# Patient Record
Sex: FEMALE | Race: WHITE
[De-identification: names, ages, dates, MRNs, and addresses within clinical notes are randomized per-mention and may not be internally consistent; named-entity substitution may affect disease eponyms.]

---

## 2017-03-31 ENCOUNTER — HOSPITAL ENCOUNTER (OUTPATIENT)
Dept: HOSPITAL 61 - PCVCCLINIC | Age: 67
Discharge: HOME | End: 2017-03-31
Attending: RADIOLOGY
Payer: COMMERCIAL

## 2017-03-31 DIAGNOSIS — I71.4: Primary | ICD-10-CM

## 2017-03-31 DIAGNOSIS — I73.9: ICD-10-CM

## 2017-03-31 DIAGNOSIS — I77.9: ICD-10-CM

## 2017-03-31 DIAGNOSIS — I25.10: ICD-10-CM

## 2017-03-31 DIAGNOSIS — E78.00: ICD-10-CM

## 2017-03-31 DIAGNOSIS — I10: ICD-10-CM

## 2017-03-31 DIAGNOSIS — N26.1: ICD-10-CM

## 2017-03-31 PROCEDURE — G0463 HOSPITAL OUTPT CLINIC VISIT: HCPCS

## 2017-11-30 ENCOUNTER — HOSPITAL ENCOUNTER (OUTPATIENT)
Dept: HOSPITAL 61 - PCVCIMAG | Age: 67
Discharge: HOME | End: 2017-11-30
Attending: RADIOLOGY
Payer: COMMERCIAL

## 2017-11-30 DIAGNOSIS — E78.00: ICD-10-CM

## 2017-11-30 DIAGNOSIS — I70.1: ICD-10-CM

## 2017-11-30 DIAGNOSIS — J44.9: ICD-10-CM

## 2017-11-30 DIAGNOSIS — I77.9: ICD-10-CM

## 2017-11-30 DIAGNOSIS — I10: ICD-10-CM

## 2017-11-30 DIAGNOSIS — F17.200: ICD-10-CM

## 2017-11-30 DIAGNOSIS — I65.23: Primary | ICD-10-CM

## 2017-11-30 DIAGNOSIS — I71.4: ICD-10-CM

## 2017-11-30 PROCEDURE — G0463 HOSPITAL OUTPT CLINIC VISIT: HCPCS

## 2017-11-30 PROCEDURE — 93978 VASCULAR STUDY: CPT

## 2017-11-30 PROCEDURE — 93880 EXTRACRANIAL BILAT STUDY: CPT

## 2017-11-30 NOTE — PCVCIMAG
EXAM: AORTOILIAC DUPLEX



INDICATION: Abdominal aortic aneurysm with prior stent graft repair. 



FINDINGS: 



AORTA: Suprarenal aorta measures maximum diameter of 2.5 cm. Prior

stent graft repair of abdominal aortic aneurysm appears intact without

obvious endoleak. Residual aneurysm sac measures maximum diameter of

6.5 cm. No significant aortic stenosis.



RIGHT COMMON ILIAC ARTERY: Maximum diameter is 2.1 cm. No significant

stenosis.



RIGHT EXTERNAL ILIAC ARTERY:  No significant stenosis.



LEFT COMMON ILIAC ARTERY: Maximum diameter is 2.3 cm.  No significant

stenosis.



LEFT EXTERNAL ILIAC ARTERY:  No significant stenosis.



IMPRESSION: 

Intact stent graft repair of abdominal aortic aneurysm by ultrasound

criteria. Residual aneurysm sac has not definitely changed in size

since prior CT study.



LOC:[id number]

## 2017-11-30 NOTE — PCVCIMAG
EXAM: BILATERAL CAROTID DUPLEX



INDICATION: Carotid Occlusive Disease.



FINDINGS: 

Doppler Measurements (centimeters per second):



RIGHT:  Peak CCA-82, Peak ECA-130, Diastolic ICA-35, Peak ICA-131,

ICA/CCA Ratio-1.6.



LEFT:  Peak CCA-72, Peak ECA-360, Diastolic ICA-60, Peak ICA-239,

ICA/CCA Ratio-3.3.



RIGHT CAROTID: The carotid bulb has moderate plaque. The proximal

internal carotid artery shows 40-50% stenosis. The common carotid

artery shows no significant stenosis. The external carotid artery

shows no significant stenosis.



LEFT CAROTID:  The carotid bulb has moderate plaque. The proximal

internal carotid artery shows 70% stenosis. The common carotid artery

shows no significant stenosis. The external carotid artery shows 90%

stenosis.



Antegrade flow in both vertebral arteries.



IMPRESSION: 

40-50% stenosis of the right internal carotid artery with moderate

plaque.

70% stenosis of the left internal carotid artery with moderate plaque.



LOC:[place] [id number]

## 2018-05-07 ENCOUNTER — HOSPITAL ENCOUNTER (OUTPATIENT)
Dept: HOSPITAL 61 - PCVCIMAG | Age: 68
Discharge: HOME | End: 2018-05-07
Attending: INTERNAL MEDICINE
Payer: COMMERCIAL

## 2018-05-07 DIAGNOSIS — I71.4: ICD-10-CM

## 2018-05-07 DIAGNOSIS — I10: ICD-10-CM

## 2018-05-07 DIAGNOSIS — I70.1: ICD-10-CM

## 2018-05-07 DIAGNOSIS — I73.9: Primary | ICD-10-CM

## 2018-05-07 PROCEDURE — 93978 VASCULAR STUDY: CPT

## 2018-05-07 PROCEDURE — 76770 US EXAM ABDO BACK WALL COMP: CPT

## 2018-05-07 PROCEDURE — 93975 VASCULAR STUDY: CPT

## 2018-05-07 PROCEDURE — 93925 LOWER EXTREMITY STUDY: CPT

## 2018-08-28 ENCOUNTER — HOSPITAL ENCOUNTER (OUTPATIENT)
Dept: HOSPITAL 61 - PCVCIMAG | Age: 68
Discharge: HOME | End: 2018-08-28
Attending: RADIOLOGY
Payer: COMMERCIAL

## 2018-08-28 DIAGNOSIS — I65.23: Primary | ICD-10-CM

## 2018-08-28 DIAGNOSIS — E78.5: ICD-10-CM

## 2018-08-28 DIAGNOSIS — I10: ICD-10-CM

## 2018-08-28 PROCEDURE — 93880 EXTRACRANIAL BILAT STUDY: CPT

## 2018-08-28 NOTE — PCVCIMAG
EXAM: BILATERAL CAROTID DUPLEX



INDICATION: Carotid Occlusive Disease.



FINDINGS: 

Doppler Measurements (centimeters per second):



RIGHT:  Peak CCA-110, Peak ECA-105, Diastolic ICA-27, Peak ICA-106,

ICA/CCA Ratio-1.0.



LEFT:  Peak CCA-89, Peak ECA-299, Diastolic ICA-39, Peak ICA-207,

ICA/CCA Ratio-2.3.



RIGHT CAROTID: The carotid bulb has moderate plaque. The proximal

internal carotid artery shows <40% stenosis. The common carotid artery

shows no significant stenosis. The external carotid artery shows no

significant stenosis.



LEFT CAROTID:  The carotid bulb has moderate plaque. The proximal

internal carotid artery shows 60-70% stenosis. The common carotid

artery shows no significant stenosis. The external carotid artery

shows 80% stenosis.



Antegrade flow in both vertebral arteries.



IMPRESSION: 

<40% stenosis of the right internal carotid artery with moderate

plaque.

60-70% stenosis of the left internal carotid artery with moderate

plaque.



LOC:[place] [id number]

## 2019-08-29 ENCOUNTER — HOSPITAL ENCOUNTER (OUTPATIENT)
Dept: HOSPITAL 61 - PCVCIMAG | Age: 69
Discharge: HOME | End: 2019-08-29
Attending: RADIOLOGY
Payer: COMMERCIAL

## 2019-08-29 DIAGNOSIS — F17.200: ICD-10-CM

## 2019-08-29 DIAGNOSIS — J44.9: ICD-10-CM

## 2019-08-29 DIAGNOSIS — N18.3: ICD-10-CM

## 2019-08-29 DIAGNOSIS — I65.23: Primary | ICD-10-CM

## 2019-08-29 DIAGNOSIS — I12.9: ICD-10-CM

## 2019-08-29 DIAGNOSIS — I25.10: ICD-10-CM

## 2019-08-29 DIAGNOSIS — E78.00: ICD-10-CM

## 2019-08-29 PROCEDURE — 93880 EXTRACRANIAL BILAT STUDY: CPT

## 2019-08-29 NOTE — PCVCIMAG
EXAM: BILATERAL CAROTID DUPLEX



INDICATION: Carotid Occlusive Disease.



FINDINGS: 

Doppler Measurements (centimeters per second):



RIGHT:  Peak CCA-77, Peak ECA-139, Diastolic ICA-31, Peak ICA-123,

ICA/CCA Ratio-1.6.



LEFT:  Peak CCA-70, Peak ECA-305, Diastolic ICA-44, Peak ICA-202,

ICA/CCA Ratio-2.9.



RIGHT CAROTID: The carotid bulb has moderate plaque. The proximal

internal carotid artery shows 40% stenosis. The common carotid artery

shows no significant stenosis. The external carotid artery shows 40%

stenosis.



LEFT CAROTID:  The carotid bulb has moderately severe plaque. The

proximal internal carotid artery shows 60-70% stenosis. The common

carotid artery shows no significant stenosis. The external carotid

artery shows 90% stenosis.



Antegrade flow in both vertebral arteries.



IMPRESSION: 

40% stenosis of the right internal carotid artery with moderate

plaque.

60-70% stenosis of the left internal carotid artery with moderately

severe plaque.

No change since August 2018 study.



LOC:[id number]